# Patient Record
Sex: MALE | Race: WHITE | NOT HISPANIC OR LATINO | URBAN - METROPOLITAN AREA
[De-identification: names, ages, dates, MRNs, and addresses within clinical notes are randomized per-mention and may not be internally consistent; named-entity substitution may affect disease eponyms.]

---

## 2017-09-27 ENCOUNTER — APPOINTMENT (OUTPATIENT)
Dept: LAB | Facility: HOSPITAL | Age: 62
End: 2017-09-27
Attending: UROLOGY
Payer: OTHER GOVERNMENT

## 2017-09-27 ENCOUNTER — ALLSCRIPTS OFFICE VISIT (OUTPATIENT)
Dept: OTHER | Facility: OTHER | Age: 62
End: 2017-09-27

## 2017-09-27 DIAGNOSIS — R31.29 OTHER MICROSCOPIC HEMATURIA: ICD-10-CM

## 2017-09-27 LAB
BACTERIA UR QL AUTO: ABNORMAL /HPF
BILIRUB UR QL STRIP: NEGATIVE
CLARITY UR: CLEAR
CLARITY UR: NORMAL
COLOR UR: YELLOW
COLOR UR: YELLOW
GLUCOSE (HISTORICAL): NORMAL
GLUCOSE UR STRIP-MCNC: NEGATIVE MG/DL
HGB UR QL STRIP.AUTO: NEGATIVE
HGB UR QL STRIP.AUTO: NORMAL
HYALINE CASTS #/AREA URNS LPF: ABNORMAL /LPF
KETONES UR STRIP-MCNC: NEGATIVE MG/DL
KETONES UR STRIP-MCNC: NORMAL MG/DL
LEUKOCYTE ESTERASE UR QL STRIP: NEGATIVE
LEUKOCYTE ESTERASE UR QL STRIP: NORMAL
NITRITE UR QL STRIP: NEGATIVE
NITRITE UR QL STRIP: NORMAL
NON-SQ EPI CELLS URNS QL MICRO: ABNORMAL /HPF
PH UR STRIP.AUTO: 7 [PH]
PH UR STRIP.AUTO: 7 [PH] (ref 4.5–8)
PROT UR STRIP-MCNC: NEGATIVE MG/DL
PROT UR STRIP-MCNC: NORMAL MG/DL
RBC #/AREA URNS AUTO: ABNORMAL /HPF
SP GR UR STRIP.AUTO: 1.01
SP GR UR STRIP.AUTO: 1.02 (ref 1–1.03)
UROBILINOGEN UR QL STRIP.AUTO: 0.2 E.U./DL
WBC #/AREA URNS AUTO: ABNORMAL /HPF

## 2017-09-27 PROCEDURE — 81001 URINALYSIS AUTO W/SCOPE: CPT

## 2017-11-17 ENCOUNTER — ALLSCRIPTS OFFICE VISIT (OUTPATIENT)
Dept: OTHER | Facility: OTHER | Age: 62
End: 2017-11-17

## 2017-11-17 DIAGNOSIS — R31.29 OTHER MICROSCOPIC HEMATURIA: ICD-10-CM

## 2017-11-17 DIAGNOSIS — Z12.5 ENCOUNTER FOR SCREENING FOR MALIGNANT NEOPLASM OF PROSTATE: ICD-10-CM

## 2017-11-17 LAB
BILIRUB UR QL STRIP: NORMAL
CLARITY UR: NORMAL
COLOR UR: YELLOW
GLUCOSE (HISTORICAL): NORMAL
HGB UR QL STRIP.AUTO: NORMAL
KETONES UR STRIP-MCNC: NORMAL MG/DL
LEUKOCYTE ESTERASE UR QL STRIP: NORMAL
NITRITE UR QL STRIP: NORMAL
PH UR STRIP.AUTO: 5 [PH]
PROT UR STRIP-MCNC: NORMAL MG/DL
SP GR UR STRIP.AUTO: 1.01
UROBILINOGEN UR QL STRIP.AUTO: NORMAL

## 2017-11-18 NOTE — PROCEDURES
Assessment  1  Microhematuria (599 72) (R31 29)   2  Erectile dysfunction of non-organic origin (302 72) (F52 21)   3  Special screening, prostate cancer (V76 44) (Z12 5)    Plan  Erectile dysfunction of non-organic origin    · Viagra 100 MG Oral Tablet; TAKE 1 TABLET BY MOUTH AS DIRECTED   Rx By: Alfredo Pelletier; Dispense: 6 Days ; #:6 Tablet; Refill: 11; Erectile dysfunction of non-organic origin; ZAHEER = N; Print Rx  Microhematuria    · Urine Dip Non-Automated- POC; Status:Complete - Retrospective By ProtocolAuthorization;   Done: [id number] 08:54AM   Performed: In Office; [phone]; Last Updated By:Richard Vela; 11/17/2017 8:57:34 AM;Ordered; For:Microhematuria; Ordered By:Elvira Wallis;   · [facility] KID[facility] BLADDER; Status:Hold For - Scheduling; Requested for:17Nov2017;    Perform:[facility]; BAA:[id number]; Ordered;Ordered By:Jim Wallis;  Special screening, prostate cancer    · (1) PSA (SCREEN) (Dx V76 44 Screen for Prostate Cancer); Status:Active; Requestedfor:17Nov2017;    Perform:[facility]; Due:17Nov2018; Ordered;screening, prostate cancer; Ordered By:Elvira Wallis; Discussion/Summary  Discussion Summary:   Cystoscopy is normal  I am ordering an ultrasound of the urinary tract to evaluate  Otherwise he may follow up in 1 year for routine prostate evaluation with PSA screening  also mentioned some erectile dysfunction  After discussion of risks and benefits, he would like to try PDE 5 inhibitor  Prescription for Viagra was given with coupon  Medication SE Review and Pt Understands Tx: Possible side effects of new medications were reviewed with the patient/guardian today  The treatment plan was reviewed with the patient/guardian  The patient/guardian understands and agrees with the treatment plan   Understands and agrees with treatment plan: The treatment plan was reviewed with the patient/guardian   The patient/guardian understands and agrees with the treatment plan Chief Complaint  Chief Complaint Free Text Note Form: Patient presents for Cysto; microhematuria      History of Present Illness  HPI: Microscopic hematuria evaluation  Patient has no complaints  He reports a prior normal evaluation several years ago  No upper tract studies done recently  Review of Systems  Complete-Male Urology:  Constitutional: No fever or chills, feels well, no tiredness, no recent weight gain or weight loss  Respiratory: No complaints of shortness of breath, no wheezing, no cough, no SOB on exertion, no orthopnea or PND  Cardiovascular: No complaints of slow heart rate, no fast heart rate, no chest pain, no palpitations, no leg claudication, no lower extremity  Gastrointestinal: No complaints of abdominal pain, no constipation, no nausea or vomiting, no diarrhea or bloody stools  Genitourinary: Empty sensation-- and-- stream quality fair, but-- no dysuria,-- no urinary hesitancy,-- no hematuria,-- no incontinence,-- no nocturia-- and-- no feelings of urinary urgency  Musculoskeletal: No complaints of arthralgia, no myalgias, no joint swelling or stiffness, no limb pain or swelling  Integumentary: No complaints of skin rash or skin lesions, no itching, no skin wound, no dry skin  Hematologic/Lymphatic: No complaints of swollen glands, no swollen glands in the neck, does not bleed easily, no easy bruising  Neurological: No compliants of headache, no confusion, no convulsions, no numbness or tingling, no dizziness or fainting, no limb weakness, no difficulty walking  Active Problems    1  Arthritis (716 90) (M19 90)   2  Back pain (724 5) (M54 9)   3  Microhematuria (599 72) (R31 29)   4  Type 2 diabetes mellitus without complication, unspecified long term insulin use status (250 00) (E11 9)    Past Medical History  1  History of No significant past medical history    Surgical History  1  History of Inguinal Hernia Repair   2  History of Surgery Vas Deferens Vasectomy   3  History of Tonsillectomy    Family History  Mother    1  Family history of cardiac disorder (V17 49) (Z82 49)   2  Family history of cerebrovascular accident (CVA) (V17 1) (Z82 3)   3  Family history of hypertension (V17 49) (Z82 49)  Father    4  Family history of diabetes mellitus (V18 0) (Z83 3)   5  Family history of Kidney stones, calcium oxalate    Social History     · Never a smoker   · Social drinker (V49 89) (Z78 9)    Current Meds   1  Januvia 100 MG Oral Tablet; Therapy: (Recorded:60Uxk2363) to Recorded   2  Simvastatin 20 MG Oral Tablet; Therapy: (Recorded:38Qrv6089) to Recorded   3  Zetia 10 MG Oral Tablet; Therapy: (Recorded:74Ejn2061) to Recorded    Allergies  1  amoxicillin   2  Sulfamethoxazole-TMP DS TABS    Vitals  Vital Signs    Recorded: 60QVN9837 08:55AM   Heart Rate 80   Systolic 993   Diastolic 78   Height 6 ft 4 in   Weight 202 lb    BMI Calculated 24 59   BSA Calculated 2 22     Results/Data  Urine Dip Non-Automated- POC [id number] 08:54AM Asim Callahan     Test Name Result Flag Reference   Color Yellow       Clarity Transparent     Leukocytes -     Nitrite -     Blood trace     Bilirubin -     Urobilinogen -     Protein -     Ph 5 0     Specific Gravity 1 015     Ketone -     Glucose -           Procedure   Procedure: diagnostic cystourethroscopy  Indications for the procedure include hematuria  Risks, risk of bleeding and infection risks were discussed with the patient  Written consent was obtained prior to the procedure and is detailed in the patient's record  Anesthesia: the urethra was lubricated with 2% lidocaine gel  Procedure Note:   The patient was prepped and draped in the usual sterile fashion using betadine  The periurethral area was exposed and a lubricated 16 Cymraes flexible cystoscope was introduced into the urethral meatus   The urethra was normal  The prostate was visualized at the proximal urethra and bladder neck and the prostate appeared normal  All regions of the bladder were systematically inspected and the bladder appeared normal    Post-procedure: the bladder was drained and the cystoscope was removed      Signatures   Electronically signed by : IVELISSE Street ; Nov 17 2017  9:39AM EST                       (Author)

## 2017-12-13 ENCOUNTER — HOSPITAL ENCOUNTER (OUTPATIENT)
Dept: RADIOLOGY | Facility: HOSPITAL | Age: 62
Discharge: HOME | End: 2017-12-13
Payer: OTHER GOVERNMENT

## 2017-12-13 DIAGNOSIS — R31.29 OTHER MICROSCOPIC HEMATURIA: ICD-10-CM

## 2017-12-13 PROCEDURE — 76770 US EXAM ABDO BACK WALL COMP: CPT

## 2018-01-13 VITALS
DIASTOLIC BLOOD PRESSURE: 78 MMHG | WEIGHT: 202 LBS | HEART RATE: 80 BPM | BODY MASS INDEX: 24.6 KG/M2 | HEIGHT: 76 IN | SYSTOLIC BLOOD PRESSURE: 146 MMHG

## 2018-01-14 VITALS
SYSTOLIC BLOOD PRESSURE: 160 MMHG | HEART RATE: 80 BPM | HEIGHT: 76 IN | DIASTOLIC BLOOD PRESSURE: 90 MMHG | WEIGHT: 201 LBS | BODY MASS INDEX: 24.48 KG/M2

## 2019-01-14 ENCOUNTER — TELEPHONE (OUTPATIENT)
Dept: UROLOGY | Facility: MEDICAL CENTER | Age: 64
End: 2019-01-14

## 2019-01-14 DIAGNOSIS — Z12.5 ENCOUNTER FOR PROSTATE CANCER SCREENING: Primary | ICD-10-CM

## 2019-01-14 NOTE — TELEPHONE ENCOUNTER
Pt calling- Please put updated PSA in system, will go to [facility] to have done    [phone]

## 2019-01-14 NOTE — TELEPHONE ENCOUNTER
Order placed in chart and patient notified to have PSA done prior to next appt  He verbalized understanding

## 2019-01-22 ENCOUNTER — TELEPHONE (OUTPATIENT)
Dept: UROLOGY | Facility: MEDICAL CENTER | Age: 64
End: 2019-01-22

## 2019-01-22 NOTE — TELEPHONE ENCOUNTER
[facility] from [facility] called stated fax did not go thru received fax and sent over to [place] office [phone]

## 2019-01-23 ENCOUNTER — OFFICE VISIT (OUTPATIENT)
Dept: UROLOGY | Facility: AMBULATORY SURGERY CENTER | Age: 64
End: 2019-01-23
Payer: OTHER GOVERNMENT

## 2019-01-23 VITALS
HEART RATE: 84 BPM | HEIGHT: 76 IN | WEIGHT: 210.4 LBS | DIASTOLIC BLOOD PRESSURE: 90 MMHG | BODY MASS INDEX: 25.62 KG/M2 | SYSTOLIC BLOOD PRESSURE: 146 MMHG

## 2019-01-23 DIAGNOSIS — Z12.5 SCREENING FOR PROSTATE CANCER: Primary | ICD-10-CM

## 2019-01-23 PROCEDURE — 99213 OFFICE O/P EST LOW 20 MIN: CPT | Performed by: UROLOGY

## 2019-01-23 RX ORDER — LOSARTAN POTASSIUM 50 MG/1
25 TABLET ORAL DAILY
COMMUNITY

## 2019-01-23 RX ORDER — SIMVASTATIN 20 MG
TABLET ORAL
COMMUNITY

## 2019-01-23 NOTE — PROGRESS NOTES
1/23/2019    Benoit Campos  1955  [id number]        Assessment  Prostate cancer screening    Plan  Patient was reassured  He will evaluate annually as recommended per screening protocol with physical examination and PSA  He will notify us sooner if he develops urinary symptoms or desires treatment for any recurrent erectile dysfunction  History of Present Illness  Pedro Klein is a 66 y o  male with history of microscopic hematuria  He had a negative evaluation at the end of 2017  He returns today for prostate cancer screening  PSA was 0 44 last year, and 0 9 currently  He has minimal urinary symptoms and no complaints  Previously was treated for erectile dysfunction with Viagra on 1 occasion, however no longer having complaints  AUA SYMPTOM SCORE      Most Recent Value   AUA SYMPTOM SCORE   How often have you had a sensation of not emptying your bladder completely after you finished urinating? 1   How often have you had to urinate again less than two hours after you finished urinating? 0   How often have you found you stopped and started again several times when you urinate? 1   How often have you found it difficult to postpone urination? 0   How often have you had a weak urinary stream?  2   How often have you had to push or strain to begin urination? 1   How many times did you most typically get up to urinate from the time you went to bed at night until the time you got up in the morning?  0   Quality of Life: If you were to spend the rest of your life with your urinary condition just the way it is now, how would you feel about that?  3   AUA SYMPTOM SCORE  5          Review of Systems  Review of Systems   Constitutional: Negative  HENT: Negative  Respiratory: Negative  Cardiovascular: Negative  Gastrointestinal: Negative  Genitourinary:        As per HPI   Musculoskeletal: Negative  Skin: Negative  Neurological: Negative  Hematological: Negative  Past Medical History  History reviewed  No pertinent past medical history  Past Social History  Past Surgical History:   Procedure Laterality Date    HERNIA REPAIR      TONSILLECTOMY      VASECTOMY         Past Family History  History reviewed  No pertinent family history  Past Social history  Social History     Social History    Marital status:      Spouse name: N/A    Number of children: N/A    Years of education: N/A     Occupational History    Not on file  Social History Main Topics    Smoking status: Never Smoker    Smokeless tobacco: Former User    Alcohol use Yes      Comment: social    Drug use: No    Sexual activity: Not on file     Other Topics Concern    Not on file     Social History Narrative    No narrative on file     History   Smoking Status    Never Smoker   Smokeless Tobacco    Former User       Current Medications  Current Outpatient Prescriptions   Medication Sig Dispense Refill    losartan (COZAAR) 50 mg tablet Take 25 mg by mouth daily      simvastatin (ZOCOR) 20 mg tablet Take by mouth       No current facility-administered medications for this visit  Allergies  Allergies   Allergen Reactions    Sulfamethoxazole-Trimethoprim     Amoxicillin Rash       Past Medical History, Social History, Family History, medications and allergies were reviewed  Vitals  Vitals:    01/23/19 1311   BP: 146/90   BP Location: Left arm   Patient Position: Sitting   Cuff Size: Adult   Pulse: 84   Weight: 95 4 kg (210 lb 6 4 oz)   Height: 6' 4" (1 93 m)       Physical Exam  Physical Exam   Constitutional: He is oriented to person, place, and time  He appears well-developed and well-nourished  Cardiovascular: Normal rate  Pulmonary/Chest: Effort normal    Abdominal: Soft  Genitourinary:   Genitourinary Comments: Prostate about 25 g, smooth, firm at the right apex   Musculoskeletal: Normal range of motion     Neurological: He is alert and oriented to person, place, and time  Skin: Skin is warm, dry and intact  Psychiatric: He has a normal mood and affect  Vitals reviewed          Results  No results found for: PSA  No results found for: GLUCOSE, CALCIUM, NA, K, CO2, CL, BUN, CREATININE  No results found for: WBC, HGB, HCT, MCV, PLT

## 2019-01-23 NOTE — LETTER
January 23, 2019     Netta WashingtonDRE [address]  [address] [address]    Patient: Sonia Mayes   YOB: 1955   Date of Visit: 1/23/2019       Dear Dr Martinez Stands:    Thank you for referring Sonia Mayes to me for evaluation  Below are my notes for this consultation  If you have questions, please do not hesitate to call me  I look forward to following your patient along with you  Sincerely,        Rachell Gray MD        CC: Joleen Hamlin MD  1/23/2019  1:24 PM  Sign at close encounter  1/23/2019    Sonia Mayes  1955  [id number]        Assessment  Prostate cancer screening    Plan  Patient was reassured  He will evaluate annually as recommended per screening protocol with physical examination and PSA  He will notify us sooner if he develops urinary symptoms or desires treatment for any recurrent erectile dysfunction  History of Present Illness  Holly Kam is a 66 y o  male with history of microscopic hematuria  He had a negative evaluation at the end of 2017  He returns today for prostate cancer screening  PSA was 0 44 last year, and 0 9 currently  He has minimal urinary symptoms and no complaints  Previously was treated for erectile dysfunction with Viagra on 1 occasion, however no longer having complaints  AUA SYMPTOM SCORE      Most Recent Value   AUA SYMPTOM SCORE   How often have you had a sensation of not emptying your bladder completely after you finished urinating? 1   How often have you had to urinate again less than two hours after you finished urinating? 0   How often have you found you stopped and started again several times when you urinate? 1   How often have you found it difficult to postpone urination? 0   How often have you had a weak urinary stream?  2   How often have you had to push or strain to begin urination?   1   How many times did you most typically get up to urinate from the time you went to bed at night until the time you got up in the morning?  0   Quality of Life: If you were to spend the rest of your life with your urinary condition just the way it is now, how would you feel about that?  3   AUA SYMPTOM SCORE  5          Review of Systems  Review of Systems   Constitutional: Negative  HENT: Negative  Respiratory: Negative  Cardiovascular: Negative  Gastrointestinal: Negative  Genitourinary:        As per HPI   Musculoskeletal: Negative  Skin: Negative  Neurological: Negative  Hematological: Negative  Past Medical History  History reviewed  No pertinent past medical history  Past Social History  Past Surgical History:   Procedure Laterality Date    HERNIA REPAIR      TONSILLECTOMY      VASECTOMY         Past Family History  History reviewed  No pertinent family history  Past Social history  Social History     Social History    Marital status:      Spouse name: N/A    Number of children: N/A    Years of education: N/A     Occupational History    Not on file  Social History Main Topics    Smoking status: Never Smoker    Smokeless tobacco: Former User    Alcohol use Yes      Comment: social    Drug use: No    Sexual activity: Not on file     Other Topics Concern    Not on file     Social History Narrative    No narrative on file     History   Smoking Status    Never Smoker   Smokeless Tobacco    Former User       Current Medications  Current Outpatient Prescriptions   Medication Sig Dispense Refill    losartan (COZAAR) 50 mg tablet Take 25 mg by mouth daily      simvastatin (ZOCOR) 20 mg tablet Take by mouth       No current facility-administered medications for this visit  Allergies  Allergies   Allergen Reactions    Sulfamethoxazole-Trimethoprim     Amoxicillin Rash       Past Medical History, Social History, Family History, medications and allergies were reviewed      Vitals  Vitals:    01/23/19 1311   BP: 146/90 BP Location: Left arm   Patient Position: Sitting   Cuff Size: Adult   Pulse: 84   Weight: 95 4 kg (210 lb 6 4 oz)   Height: 6' 4" (1 93 m)       Physical Exam  Physical Exam   Constitutional: He is oriented to person, place, and time  He appears well-developed and well-nourished  Cardiovascular: Normal rate  Pulmonary/Chest: Effort normal    Abdominal: Soft  Genitourinary:   Genitourinary Comments: Prostate about 25 g, smooth, firm at the right apex   Musculoskeletal: Normal range of motion  Neurological: He is alert and oriented to person, place, and time  Skin: Skin is warm, dry and intact  Psychiatric: He has a normal mood and affect  Vitals reviewed          Results  No results found for: PSA  No results found for: GLUCOSE, CALCIUM, NA, K, CO2, CL, BUN, CREATININE  No results found for: WBC, HGB, HCT, MCV, PLT

## 2020-02-03 ENCOUNTER — OFFICE VISIT (OUTPATIENT)
Dept: UROLOGY | Facility: AMBULATORY SURGERY CENTER | Age: 65
End: 2020-02-03
Payer: OTHER GOVERNMENT

## 2020-02-03 VITALS
SYSTOLIC BLOOD PRESSURE: 118 MMHG | DIASTOLIC BLOOD PRESSURE: 72 MMHG | BODY MASS INDEX: 25.69 KG/M2 | HEIGHT: 76 IN | HEART RATE: 64 BPM | WEIGHT: 211 LBS

## 2020-02-03 DIAGNOSIS — R97.20 ELEVATED PSA: Primary | ICD-10-CM

## 2020-02-03 PROCEDURE — 99214 OFFICE O/P EST MOD 30 MIN: CPT | Performed by: UROLOGY

## 2020-02-03 RX ORDER — LISINOPRIL 10 MG/1
TABLET ORAL
COMMUNITY
Start: 2019-08-15

## 2020-02-03 RX ORDER — EZETIMIBE 10 MG/1
10 TABLET ORAL DAILY
COMMUNITY

## 2020-02-03 NOTE — PROGRESS NOTES
2/3/2020    Natachaivan Favorite  1955  [id number]        Assessment  Right prostate nodule    Plan  We discussed finding of rising PSA as well as abnormal prostate examination  Discussed the possibility of further evaluation with MRI in order to identify and potentially pinpoint this area for subsequent prostate biopsy  This could be consistent with standard of care now for suspicious nodule to proceed with MRI fusion biopsy which requires MRI imaging first   He understands and agrees with this plan  Regarding Peyronie's, we had discussion about this as well  We discussed the natural history an etiology  If not symptomatic and not interfering with function, I do not recommend intervention  Intervention can take the form of injection with collagenase or surgical plication or even grafting with plaque excision  We discussed the risks and benefits of each of these and he is not comfortable taking risks at this time as he is doing relatively well  He will observe this  All questions answered to his satisfaction  Will await MRI to determine if MRI fusion biopsy is appropriate  History of Present Illness  Jaun is a 60 y o  male with history of microscopic hematuria status post negative evaluation  He has been undergoing prostate screening over the past few years  PSA was 0 9 and subsequently 0 44 last year  Currently PSA has risen to 1 10  He was not sexually active prior to this  He also reports weaker urinary stream and some changes overall  There are not terribly bothersome but he is beginning to notice them more  Additionally, he reports a plaque in curvature consistent with what he believes is Peyronie's disease  There is dorsal curvature but does not interfere with his ability to obtain erection or have intercourse  It is not painful          AUA SYMPTOM SCORE      Most Recent Value   AUA SYMPTOM SCORE   How often have you had a sensation of not emptying your bladder completely after you finished urinating? 2   How often have you had to urinate again less than two hours after you finished urinating? 1   How often have you found you stopped and started again several times when you urinate? 2   How often have you found it difficult to postpone urination? 0   How often have you had a weak urinary stream?  3   How often have you had to push or strain to begin urination? 3   How many times did you most typically get up to urinate from the time you went to bed at night until the time you got up in the morning? 1   Quality of Life: If you were to spend the rest of your life with your urinary condition just the way it is now, how would you feel about that?  3   AUA SYMPTOM SCORE  12          Review of Systems  Review of Systems   Constitutional: Negative  HENT: Negative  Respiratory: Negative  Cardiovascular: Negative  Gastrointestinal: Negative  Genitourinary:        As per HPI   Musculoskeletal: Negative  Skin: Negative  Neurological: Negative  Hematological: Negative  Past Medical History  History reviewed  No pertinent past medical history  Past Social History  Past Surgical History:   Procedure Laterality Date    HERNIA REPAIR      TONSILLECTOMY      VASECTOMY         Past Family History  History reviewed  No pertinent family history      Past Social history  Social History     Socioeconomic History    Marital status:      Spouse name: Not on file    Number of children: Not on file    Years of education: Not on file    Highest education level: Not on file   Occupational History    Not on file   Social Needs    Financial resource strain: Not on file    Food insecurity:     Worry: Not on file     Inability: Not on file    Transportation needs:     Medical: Not on file     Non-medical: Not on file   Tobacco Use    Smoking status: Never Smoker    Smokeless tobacco: Former User   Substance and Sexual Activity    Alcohol use: Yes     Comment: social    Drug use: No    Sexual activity: Not on file   Lifestyle    Physical activity:     Days per week: Not on file     Minutes per session: Not on file    Stress: Not on file   Relationships    Social connections:     Talks on phone: Not on file     Gets together: Not on file     Attends Taoism service: Not on file     Active member of club or organization: Not on file     Attends meetings of clubs or organizations: Not on file     Relationship status: Not on file    Intimate partner violence:     Fear of current or ex partner: Not on file     Emotionally abused: Not on file     Physically abused: Not on file     Forced sexual activity: Not on file   Other Topics Concern    Not on file   Social History Narrative    Not on file     Social History     Tobacco Use   Smoking Status Never Smoker   Smokeless Tobacco Former User       Current Medications  Current Outpatient Medications   Medication Sig Dispense Refill    ezetimibe (ZETIA) 10 mg tablet Take 10 mg by mouth daily      lisinopril (ZESTRIL) 10 mg tablet       Omeprazole 20 MG TBDD       losartan (COZAAR) 50 mg tablet Take 25 mg by mouth daily      simvastatin (ZOCOR) 20 mg tablet Take by mouth       No current facility-administered medications for this visit  Allergies  Allergies   Allergen Reactions    Sulfamethoxazole-Trimethoprim     Amoxicillin Rash       Past Medical History, Social History, Family History, medications and allergies were reviewed  Vitals  Vitals:    02/03/20 1354   BP: 118/72   BP Location: Left arm   Patient Position: Sitting   Cuff Size: Adult   Pulse: 64   Weight: 95 7 kg (211 lb)   Height: 6' 4" (1 93 m)       Physical Exam  Physical Exam   Constitutional: He is oriented to person, place, and time  He appears well-developed and well-nourished  Cardiovascular: Normal rate  Pulmonary/Chest: Effort normal    Abdominal: Soft     Genitourinary:   Genitourinary Comments: Prostate about 30 g, with firm nodule at the right mid gland  Musculoskeletal: Normal range of motion  Neurological: He is alert and oriented to person, place, and time  Skin: Skin is warm, dry and intact  Psychiatric: He has a normal mood and affect  Vitals reviewed          Results  No results found for: PSA  No results found for: GLUCOSE, CALCIUM, NA, K, CO2, CL, BUN, CREATININE  No results found for: WBC, HGB, HCT, MCV, PLT

## 2020-02-18 ENCOUNTER — TELEPHONE (OUTPATIENT)
Dept: UROLOGY | Facility: AMBULATORY SURGERY CENTER | Age: 65
End: 2020-02-18

## 2020-02-18 DIAGNOSIS — R30.0 BURNING WITH URINATION: Primary | ICD-10-CM

## 2020-02-18 NOTE — TELEPHONE ENCOUNTER
Patient managed by Dr Barrett Dasilva at the [place] office  He has a history of right prostate nodule  Last seen in office with rising PSA  Is scheduled for a prostate MRI  Patient called with complaints of "Mild stinging, no bad pain, some discharge"  Call placed to patient, advised orders placed for UA C&S to rule out urinary tract infection  Office will follow up as results become available usually within 48-72 hours  Patient encouraged to hydrate well with water and avoid bladder irritants  Patient instructed to call back with worsening symptoms, fever, chills, blood in the urine or difficulty urinating  Patient verbalized understanding and agrees with plan

## 2020-02-18 NOTE — TELEPHONE ENCOUNTER
----- Message from Malcolm Ramon sent at 2/17/2020  9:41 PM EST -----  Regarding: Prescription Question  Contact: [phone]  Dr Heydi Singer - where can I find results of urinalysis from 3 February? I seem to have picked up UTI in the last few days, results might show bacterial infection  Haven't had UTI in a few decades but symptoms are familiar  Mild stinging, no bad pain, some discharge    Possible I can get an antibiotic Rx from [facility] without additional test?  Anything except Amoxicillin and Sulfamethoxazole-trimethoprim, that is   thank you  Mahin Beck

## 2020-02-21 ENCOUNTER — HOSPITAL ENCOUNTER (OUTPATIENT)
Dept: RADIOLOGY | Facility: HOSPITAL | Age: 65
Discharge: HOME | End: 2020-02-21
Payer: OTHER GOVERNMENT

## 2020-02-21 DIAGNOSIS — R97.20 ELEVATED PSA: ICD-10-CM

## 2020-02-21 PROCEDURE — A9585 GADOBUTROL INJECTION: HCPCS | Performed by: UROLOGY

## 2020-02-21 PROCEDURE — 72197 MRI PELVIS W/O & W/DYE: CPT

## 2020-02-21 PROCEDURE — 76377 3D RENDER W/INTRP POSTPROCES: CPT

## 2020-02-21 RX ADMIN — GADOBUTROL 10 ML: 604.72 INJECTION INTRAVENOUS at 08:42

## 2020-03-13 ENCOUNTER — OFFICE VISIT (OUTPATIENT)
Dept: UROLOGY | Facility: AMBULATORY SURGERY CENTER | Age: 65
End: 2020-03-13
Payer: OTHER GOVERNMENT

## 2020-03-13 VITALS
HEART RATE: 59 BPM | BODY MASS INDEX: 25.94 KG/M2 | WEIGHT: 213 LBS | SYSTOLIC BLOOD PRESSURE: 116 MMHG | HEIGHT: 76 IN | DIASTOLIC BLOOD PRESSURE: 70 MMHG

## 2020-03-13 DIAGNOSIS — R97.20 ELEVATED PSA: ICD-10-CM

## 2020-03-13 DIAGNOSIS — N40.2 PROSTATE NODULE: Primary | ICD-10-CM

## 2020-03-13 LAB
SL AMB  POCT GLUCOSE, UA: NORMAL
SL AMB LEUKOCYTE ESTERASE,UA: NORMAL
SL AMB POCT BILIRUBIN,UA: NORMAL
SL AMB POCT BLOOD,UA: NORMAL
SL AMB POCT CLARITY,UA: YELLOW
SL AMB POCT COLOR,UA: CLEAR
SL AMB POCT KETONES,UA: NORMAL
SL AMB POCT NITRITE,UA: NORMAL
SL AMB POCT PH,UA: 7.5
SL AMB POCT SPECIFIC GRAVITY,UA: 1.01
SL AMB POCT URINE PROTEIN: NORMAL
SL AMB POCT UROBILINOGEN: 0.2

## 2020-03-13 PROCEDURE — 81002 URINALYSIS NONAUTO W/O SCOPE: CPT | Performed by: UROLOGY

## 2020-03-13 PROCEDURE — 99214 OFFICE O/P EST MOD 30 MIN: CPT | Performed by: UROLOGY

## 2020-03-13 NOTE — PROGRESS NOTES
3/13/2020    [facility]  1955  [id number]        Assessment  Prostate nodule    Plan  We reviewed the results in detail  It seems as though the nodule in question is only Pi-Rads 1, not concerning for malignancy  It was a finding of poorly defined infiltrating type area with Pi-Rads 3  If this was more definitive, I would certainly recommend MRI fusion biopsy  However because it is indeterminate, and seems like unusual finding with very low PSA, we discussed potential observation instead  Patient is much more comfortable with observation at this time  We discussed potential intervention in the future as well  For now will follow with PSA in 6 months and examination  Will re-evaluate in the future  We did discuss the possibility of high-grade aggressive ductal type carcinoma which may present with low PSA  He understands and will follow in 6 months and then routinely depending on the findings  All questions answered and he agrees with the plan  Total visit time was 25 minutes of which over 50% was spent on counseling  History of Present Illness  Yair Marcelo is a 60 y o  male with slowly rising but low PSA, currently 1 10  He has been found to have a negative microscopic hematuria evaluation, but did have a nodule on prostate examination  I sent him for a multiparametric MRI, and he returns today to discuss the results  MULTIPARAMETRIC MRI OF THE PROSTATE WITH AND WITHOUT CONTRAST-WITH 3-D POSTPROCESSING    IMPRESSION:  Larger infiltrating poorly defined area in the peripheral zone extending from the base to the apex with the hyperintensity on the DWI images and mild hypointensity on the ADC images     1  PI-RADSv2 1 Category 3 - Intermediate (the presence of clinically significant cancer is equivocal)  A well-circumscribed nodule seen at the medial right base which probably corresponds to the palpable nodule  2   No extraprostatic tumor, seminal vesicle invasion, pelvic lymphadenopathy, or pelvic osseous metastatic disease      3  Calculated prostate volume of 28 6 mL  AUA SYMPTOM SCORE      Most Recent Value   AUA SYMPTOM SCORE   How often have you had a sensation of not emptying your bladder completely after you finished urinating? 2   How often have you had to urinate again less than two hours after you finished urinating? 0   How often have you found you stopped and started again several times when you urinate? 3   How often have you found it difficult to postpone urination? 0   How often have you had a weak urinary stream?  1   How often have you had to push or strain to begin urination?   0   How many times did you most typically get up to urinate from the time you went to bed at night until the time you got up in the morning?  0   Quality of Life: If you were to spend the rest of your life with your urinary condition just the way it is now, how would you feel about that?  3   AUA SYMPTOM SCORE  6          Review of Systems  Review of Systems      Past Medical History  Past Medical History:   Diagnosis Date    Urinary tract infection        Past Social History  Past Surgical History:   Procedure Laterality Date    HERNIA REPAIR      TONSILLECTOMY      VASECTOMY         Past Family History  Family History   Problem Relation Age of Onset    Hypertension Mother     Stroke Mother        Past Social history  Social History     Socioeconomic History    Marital status:      Spouse name: Not on file    Number of children: Not on file    Years of education: Not on file    Highest education level: Not on file   Occupational History    Not on file   Social Needs    Financial resource strain: Not on file    Food insecurity:     Worry: Not on file     Inability: Not on file    Transportation needs:     Medical: Not on file     Non-medical: Not on file   Tobacco Use    Smoking status: Never Smoker    Smokeless tobacco: Former User   Substance and Sexual Activity    Alcohol use: Yes     Comment: social    Drug use: No    Sexual activity: Not on file   Lifestyle    Physical activity:     Days per week: Not on file     Minutes per session: Not on file    Stress: Not on file   Relationships    Social connections:     Talks on phone: Not on file     Gets together: Not on file     Attends Hoahaoism service: Not on file     Active member of club or organization: Not on file     Attends meetings of clubs or organizations: Not on file     Relationship status: Not on file    Intimate partner violence:     Fear of current or ex partner: Not on file     Emotionally abused: Not on file     Physically abused: Not on file     Forced sexual activity: Not on file   Other Topics Concern    Not on file   Social History Narrative    Not on file     Social History     Tobacco Use   Smoking Status Never Smoker   Smokeless Tobacco Former User       Current Medications  Current Outpatient Medications   Medication Sig Dispense Refill    lisinopril (ZESTRIL) 10 mg tablet       Omeprazole 20 MG TBDD       simvastatin (ZOCOR) 20 mg tablet Take by mouth      ezetimibe (ZETIA) 10 mg tablet Take 10 mg by mouth daily      losartan (COZAAR) 50 mg tablet Take 25 mg by mouth daily       No current facility-administered medications for this visit  Allergies  Allergies   Allergen Reactions    Sulfamethoxazole-Trimethoprim     Amoxicillin Rash       Past Medical History, Social History, Family History, medications and allergies were reviewed      Vitals  Vitals:    03/13/20 1441   BP: 116/70   BP Location: Left arm   Patient Position: Sitting   Cuff Size: Large   Pulse: 59   Weight: 96 6 kg (213 lb)   Height: 6' 4" (1 93 m)       Physical Exam  Physical Exam      Results  No results found for: PSA  No results found for: GLUCOSE, CALCIUM, NA, K, CO2, CL, BUN, CREATININE  No results found for: WBC, HGB, HCT, MCV, PLT

## 2020-09-15 ENCOUNTER — TELEPHONE (OUTPATIENT)
Dept: UROLOGY | Facility: AMBULATORY SURGERY CENTER | Age: 65
End: 2020-09-15

## 2020-09-15 NOTE — TELEPHONE ENCOUNTER
Message     Appointment canceled for [facility] [facility] ([phone])    Visit Type: FOLLOW UP [facility]    Date        Time      Length    Provider                  Department    9/11/2020    2:30 PM  15 mins  Keny Kennedy MD      [facility] [address]       Reason for Cancellation: Canceled via [address]       Patient Comments: I have not yet had another PSA test, as required   Limited access at nearby [facility]

## 2021-03-30 DIAGNOSIS — Z23 ENCOUNTER FOR IMMUNIZATION: ICD-10-CM
